# Patient Record
Sex: MALE | Race: WHITE
[De-identification: names, ages, dates, MRNs, and addresses within clinical notes are randomized per-mention and may not be internally consistent; named-entity substitution may affect disease eponyms.]

---

## 2018-03-14 ENCOUNTER — HOSPITAL ENCOUNTER (INPATIENT)
Dept: HOSPITAL 24 - MED/SURG | Age: 48
LOS: 2 days | Discharge: HOME | DRG: 639 | End: 2018-03-16
Attending: INTERNAL MEDICINE | Admitting: INTERNAL MEDICINE
Payer: COMMERCIAL

## 2018-03-14 VITALS — BODY MASS INDEX: 29.7 KG/M2

## 2018-03-14 DIAGNOSIS — E11.621: Primary | ICD-10-CM

## 2018-03-14 DIAGNOSIS — L97.519: ICD-10-CM

## 2018-03-14 DIAGNOSIS — L03.031: ICD-10-CM

## 2018-03-14 DIAGNOSIS — L03.032: ICD-10-CM

## 2018-03-14 DIAGNOSIS — E11.65: ICD-10-CM

## 2018-03-14 DIAGNOSIS — I25.10: ICD-10-CM

## 2018-03-14 DIAGNOSIS — I10: ICD-10-CM

## 2018-03-14 DIAGNOSIS — E11.40: ICD-10-CM

## 2018-03-14 DIAGNOSIS — L97.529: ICD-10-CM

## 2018-03-14 DIAGNOSIS — B96.89: ICD-10-CM

## 2018-03-14 LAB
ALBUMIN SERPL BCP-MCNC: 3.7 G/DL (ref 3.4–5)
ALP SERPL-CCNC: 90 UNITS/L (ref 46–116)
ALT SERPL W P-5'-P-CCNC: 24 UNITS/L (ref 12–78)
AST SERPL W P-5'-P-CCNC: 11 UNITS/L (ref 15–37)
BASOPHILS # BLD AUTO: 0.1 X10^3/UL (ref 0–0.1)
BASOPHILS NFR BLD AUTO: 0.7 % (ref 0.2–1)
BUN SERPL-MCNC: 11 MG/DL (ref 7–18)
CALCIUM ALBUM COR SERPL-SCNC: (no result) MG/DL (ref 8.5–10.1)
CALCIUM ALBUM COR SERPL-SCNC: 139 MMOL/L (ref 136–145)
CALCIUM SERPL-MCNC: 8.5 MG/DL (ref 8.5–10.1)
CHLORIDE SERPL-SCNC: 98 MMOL/L (ref 98–107)
CO2 SERPL-SCNC: 24.6 MMOL/L (ref 21–32)
CREAT SERPL-MCNC: 0.92 MG/DL (ref 0.7–1.3)
EGFR  BLACK RACES: > 60 (ref 60–?)
EOSINOPHIL # BLD AUTO: 0.8 X10^3/UL (ref 0–0.2)
EOSINOPHIL NFR BLD AUTO: 5.5 % (ref 0.9–2.9)
ERYTHROCYTE [DISTWIDTH] IN BLOOD BY AUTOMATED COUNT: 13.3 % (ref 11.6–16.5)
HCT VFR BLD AUTO: 42.8 % (ref 42–54)
HGB BLD-MCNC: 15 G/DL (ref 13.5–18)
LACTATE SERPL-SCNC: 2.6 MMOL/L (ref 0.4–2)
LYMPHOCYTES # BLD AUTO: 3.7 X10^3/UL (ref 1.3–2.9)
LYMPHOCYTES NFR BLD AUTO: 25.7 % (ref 21–51)
MCH RBC QN AUTO: 30.5 PG (ref 27–34)
MCHC RBC AUTO-ENTMCNC: 35 G/DL (ref 33–35)
MCV RBC AUTO: 87.1 FL (ref 80–100)
MONOCYTES # BLD AUTO: 1.1 X10^3/UL (ref 0.3–0.8)
MONOCYTES NFR BLD AUTO: 7.3 % (ref 0–13)
NEUTROPHILS # BLD AUTO: 8.8 X10^3/UL (ref 2.2–4.8)
NEUTROPHILS NFR BLD AUTO: 60.8 % (ref 42–75)
PLATELET # BLD: 194 X10^3/UL (ref 150–450)
PMV BLD AUTO: 9.7 FL (ref 7.4–11)
PROT SERPL-MCNC: 7.7 G/DL (ref 6.4–8.2)
RBC # BLD AUTO: 4.91 X10^6/UL (ref 4.7–6)
SODIUM SERPL-SCNC: 133 MMOL/L (ref 136–145)
WBC NRBC COR # BLD AUTO: 14.5 X10^3/UL (ref 3.6–10)

## 2018-03-14 PROCEDURE — 87205 SMEAR GRAM STAIN: CPT

## 2018-03-14 PROCEDURE — 71046 X-RAY EXAM CHEST 2 VIEWS: CPT

## 2018-03-14 PROCEDURE — 87086 URINE CULTURE/COLONY COUNT: CPT

## 2018-03-14 PROCEDURE — 87077 CULTURE AEROBIC IDENTIFY: CPT

## 2018-03-14 PROCEDURE — 93010 ELECTROCARDIOGRAM REPORT: CPT

## 2018-03-14 PROCEDURE — 85025 COMPLETE CBC W/AUTO DIFF WBC: CPT

## 2018-03-14 PROCEDURE — 81001 URINALYSIS AUTO W/SCOPE: CPT

## 2018-03-14 PROCEDURE — 83605 ASSAY OF LACTIC ACID: CPT

## 2018-03-14 PROCEDURE — 82565 ASSAY OF CREATININE: CPT

## 2018-03-14 PROCEDURE — 36415 COLL VENOUS BLD VENIPUNCTURE: CPT

## 2018-03-14 PROCEDURE — 87186 SC STD MICRODIL/AGAR DIL: CPT

## 2018-03-14 PROCEDURE — 80202 ASSAY OF VANCOMYCIN: CPT

## 2018-03-14 PROCEDURE — 87070 CULTURE OTHR SPECIMN AEROBIC: CPT

## 2018-03-14 PROCEDURE — 94640 AIRWAY INHALATION TREATMENT: CPT

## 2018-03-14 PROCEDURE — 80053 COMPREHEN METABOLIC PANEL: CPT

## 2018-03-14 PROCEDURE — 93005 ELECTROCARDIOGRAM TRACING: CPT

## 2018-03-14 PROCEDURE — 87040 BLOOD CULTURE FOR BACTERIA: CPT

## 2018-03-14 RX ADMIN — VANCOMYCIN HYDROCHLORIDE SCH MLS/HR: 500 INJECTION, POWDER, LYOPHILIZED, FOR SOLUTION INTRAVENOUS at 22:30

## 2018-03-14 RX ADMIN — LEVOFLOXACIN SCH MLS/HR: 5 INJECTION, SOLUTION INTRAVENOUS at 20:51

## 2018-03-14 RX ADMIN — MAGNESIUM HYDROXIDE SCH: 400 SUSPENSION ORAL at 21:37

## 2018-03-14 RX ADMIN — NICOTINE SCH EA: 21 PATCH, EXTENDED RELEASE TRANSDERMAL at 23:30

## 2018-03-14 RX ADMIN — HYDROCODONE BITARTRATE AND ACETAMINOPHEN PRN TAB: 7.5; 325 TABLET ORAL at 20:39

## 2018-03-14 RX ADMIN — DOCUSATE SODIUM SCH MG: 100 CAPSULE, LIQUID FILLED ORAL at 20:50

## 2018-03-14 RX ADMIN — DOCUSATE SODIUM SCH: 100 CAPSULE, LIQUID FILLED ORAL at 21:37

## 2018-03-14 RX ADMIN — MAGNESIUM HYDROXIDE SCH ML: 400 SUSPENSION ORAL at 20:50

## 2018-03-14 RX ADMIN — SODIUM CHLORIDE SCH MLS/HR: 9 INJECTION, SOLUTION INTRAVENOUS at 20:51

## 2018-03-15 LAB
BACTERIA URNS QL MICRO: NEGATIVE /HPF
BILIRUB UR QL STRIP.AUTO: NEGATIVE
CLARITY UR: CLEAR
COLOR UR AUTO: YELLOW
CREAT SERPL-MCNC: 0.89 MG/DL (ref 0.7–1.3)
GLUCOSE UR QL STRIP.AUTO: (no result)
KETONES UR QL STRIP.AUTO: NEGATIVE
LEUKOCYTE ESTERASE UR QL STRIP.AUTO: NEGATIVE
NITRITE UR QL STRIP.AUTO: NEGATIVE
PH UR STRIP.AUTO: 6.5 [PH] (ref 5–8)
PROT UR QL STRIP.AUTO: NEGATIVE
RBC # UR STRIP.AUTO: NEGATIVE /UL
RBC #/AREA URNS HPF: (no result) /HPF
SP GR UR STRIP.AUTO: 1.01 (ref 1–1.03)
SQUAMOUS URNS QL MICRO: (no result) /HPF
UROBILINOGEN UR QL STRIP.AUTO: NORMAL
VANCOMYCIN TROUGH SERPL-MCNC: 10.3 UG/ML (ref 15–20)

## 2018-03-15 RX ADMIN — LEVOFLOXACIN SCH MLS/HR: 5 INJECTION, SOLUTION INTRAVENOUS at 14:44

## 2018-03-15 RX ADMIN — VANCOMYCIN HYDROCHLORIDE SCH MLS/HR: 1 INJECTION, POWDER, LYOPHILIZED, FOR SOLUTION INTRAVENOUS at 14:10

## 2018-03-15 RX ADMIN — VANCOMYCIN HYDROCHLORIDE SCH MLS/HR: 500 INJECTION, POWDER, LYOPHILIZED, FOR SOLUTION INTRAVENOUS at 05:39

## 2018-03-15 RX ADMIN — NICOTINE SCH EA: 21 PATCH, EXTENDED RELEASE TRANSDERMAL at 10:05

## 2018-03-15 RX ADMIN — DOCUSATE SODIUM SCH: 100 CAPSULE, LIQUID FILLED ORAL at 20:19

## 2018-03-15 RX ADMIN — HYDROCODONE BITARTRATE AND ACETAMINOPHEN PRN TAB: 7.5; 325 TABLET ORAL at 16:00

## 2018-03-15 RX ADMIN — BUDESONIDE SCH EA: 0.5 INHALANT RESPIRATORY (INHALATION) at 21:20

## 2018-03-15 RX ADMIN — SILVER SULFADIAZINE SCH APPLIC: 10 CREAM TOPICAL at 11:24

## 2018-03-15 RX ADMIN — HYDROCODONE BITARTRATE AND ACETAMINOPHEN PRN TAB: 7.5; 325 TABLET ORAL at 09:51

## 2018-03-15 RX ADMIN — SODIUM CHLORIDE SCH MLS/HR: 9 INJECTION, SOLUTION INTRAVENOUS at 21:21

## 2018-03-15 RX ADMIN — HYDROCODONE BITARTRATE AND ACETAMINOPHEN PRN TAB: 7.5; 325 TABLET ORAL at 03:45

## 2018-03-15 RX ADMIN — VANCOMYCIN HYDROCHLORIDE SCH MLS/HR: 1 INJECTION, POWDER, LYOPHILIZED, FOR SOLUTION INTRAVENOUS at 22:08

## 2018-03-15 RX ADMIN — BUDESONIDE SCH EA: 0.5 INHALANT RESPIRATORY (INHALATION) at 15:26

## 2018-03-15 RX ADMIN — SILVER SULFADIAZINE SCH APPLIC: 10 CREAM TOPICAL at 20:22

## 2018-03-15 RX ADMIN — MAGNESIUM HYDROXIDE SCH: 400 SUSPENSION ORAL at 20:19

## 2018-03-15 RX ADMIN — HYDROCODONE BITARTRATE AND ACETAMINOPHEN PRN TAB: 7.5; 325 TABLET ORAL at 21:31

## 2018-03-15 NOTE — PCM.PROG
Progress Note





- Progress Note for Day of


Date: 03/15/18





- Subjective


Subjective: c/oburning feeling  both feet , no active drainage now





- Past Medical Family Social History


Past Med/Fam/Surg Hx: No changes since H&P


Allergies: 


Allergies





acetaminophen [From Percocet] Adverse Reaction (Verified 03/14/18 20:22)


 


oxycodone [From Percocet] Adverse Reaction (Verified 03/14/18 20:22)


 


Sulfa (Sulfonamide Antibiotics) [SULFA] Adverse Reaction (Verified 03/14/18 20:

22)


 











- Review of Systems


ROS: No change since H&P, Changes notes (describe) (long standing H/O DM with 

diabetic neuropathy , CAD , HTN)





- Vital Signs and I&O's


Vital Signs: 


 





Temperature                      97.4 F


Pulse Rate [Left Radial]         85


Pulse Rate                       73


Respiratory Rate                 20


Blood Pressure [Right Arm]       154/81


Blood Pressure [Left Arm]        143/78


Blood Pressure                   143/78


O2 Sat by Pulse Oximetry         99








Intake and Output: 


 Intake & Output











 03/13/18 03/14/18 03/15/18 03/16/18





 11:59 11:59 11:59 11:59


 


Intake Total   1420 720


 


Output Total   1925 800


 


Balance   -505 -80














- Physical Exam


Oriented: Normal


Eyes: Normal


Ear: Normal


Nose: Normal


Throat: Normal


Respiratory: Normal


Cardiovascular: Normal


: Normal


Auscultation: Bowel Sounds: Normal


Tenderness: Normal


Skin: Wound (BOTTOM OF 1ST, 2ND AND 3RD DIGITS TO BOTH FEET)


Musculoskeletal: Back:Lumbar


Psychiatric: Anxiety


Affect: Anxious


Speech Pattern: Clear, Appropriate





- Laboratory and Diagnostics


Result Diagrams: 


 03/14/18 20:06





 03/14/18 20:03


Labs: 


 





03/14/18 20:43   Toe - Right Big   Gram Stain - Final





 Laboratory











WBC  14.5 X10^3/uL (3.6-10.0)  H  03/14/18  20:06    


 


RBC  4.91 X10^6/uL (4.7-6.0)   03/14/18  20:06    


 


Hgb  15.0 g/dL (13.5-18.0)   03/14/18  20:06    


 


Hct  42.8 % (42.0-54.0)   03/14/18  20:06    


 


MCV  87.1 fL (80.0-100.0)   03/14/18  20:06    


 


MCH  30.5 pg (27.0-34.0)   03/14/18  20:06    


 


MCHC  35.0 g/dL (33.0-35.0)   03/14/18  20:06    


 


RDW  13.3 % (11.6-16.5)   03/14/18  20:06    


 


Plt Count  194 X10^3/uL (150.0-450.0)   03/14/18  20:06    


 


MPV  9.7 fL (7.4-11.0)   03/14/18  20:06    


 


Neut % (Auto)  60.8 % (42.0-75.0)   03/14/18  20:06    


 


Lymph % (Auto)  25.7 % (21.0-51.0)   03/14/18  20:06    


 


Mono % (Auto)  7.3 % (0.0-13.0)   03/14/18  20:06    


 


Eos % (Auto)  5.5 % (0.9-2.9)  H  03/14/18  20:06    


 


Baso % (Auto)  0.7 % (0.2-1.0)   03/14/18  20:06    


 


Neut # (Auto)  8.8 x10^3/uL (2.2-4.8)  H  03/14/18  20:06    


 


Lymph # (Auto)  3.7 X10^3/uL (1.3-2.9)  H  03/14/18  20:06    


 


Mono # (Auto)  1.1 x10^3/uL (0.3-0.8)  H  03/14/18  20:06    


 


Eos # (Auto)  0.8 x10^3/uL (0.0-0.2)  H  03/14/18  20:06    


 


Baso # (Auto)  0.1 X10^3/uL (0.0-0.1)   03/14/18  20:06    


 


Absolute Nucleated RBC  0.0 /100WBC  03/14/18  20:06    


 


Sodium  133 mmol/L (136-145)  L  03/14/18  20:03    


 


Corrected Sodium  139 mmol/L (136-145)   03/14/18  20:03    


 


Potassium  4.5 mmol/L (3.5-5.1)   03/14/18  20:03    


 


Chloride  98 mmol/L ()   03/14/18  20:03    


 


Carbon Dioxide  24.6 mmol/L (21-32)   03/14/18  20:03    


 


BUN  11 mg/dL (7-18)   03/14/18  20:03    


 


Creatinine  0.92 mg/dL (0.70-1.30)   03/14/18  20:03    


 


Est GFR (MDRD) Af Amer  > 60  (>60)   03/14/18  20:03    


 


Est GFR (MDRD) Non-Af  > 60  (>60)   03/14/18  20:03    


 


Glucose  365 mg/dL (65-99)  H  03/14/18  20:03    


 


POC Glucose (mg/dL)  322 mg/dL (65-99)  H  03/15/18  16:48    


 


Lactic Acid  2.6 mmol/L (0.4-2.0)  H  03/14/18  20:03    


 


Calcium  8.5 mg/dL (8.5-10.1)   03/14/18  20:03    


 


Corrected Calcium  TNP   03/14/18  20:03    


 


Total Bilirubin  0.40 mg/dL (0.2-1.0)   03/14/18  20:03    


 


AST  11 Units/L (15-37)  L  03/14/18  20:03    


 


ALT  24 Units/L (12-78)   03/14/18  20:03    


 


Alkaline Phosphatase  90 Units/L ()   03/14/18  20:03    


 


Total Protein  7.7 g/dL (6.4-8.2)   03/14/18  20:03    


 


Albumin  3.7 g/dL (3.4-5.0)   03/14/18  20:03    


 


Globulin  4.0 g/dL (2.5-4.5)   03/14/18  20:03    


 


Albumin/Globulin Ratio  0.9 Ratio (1.1-2.1)  L  03/14/18  20:03    


 


Specimen Type  Clean catch urine   03/14/18  23:18    


 


Urine Color  Yellow  (YELLOW)   03/14/18  23:18    


 


Urine Appearance  Clear  (CLEAR)   03/14/18  23:18    


 


Urine pH  6.5  (5.0 - 8.0)   03/14/18  23:18    


 


Ur Specific Gravity  1.010  (1.000-1.030)   03/14/18  23:18    


 


Urine Protein  Negative  (NEGATIVE)   03/14/18  23:18    


 


Urine Glucose (UA)  4+  (NEGATIVE)   03/14/18  23:18    


 


Urine Ketones  Negative  (NEGATIVE)   03/14/18  23:18    


 


Urine Occult Blood  Negative  (NEGATIVE)   03/14/18  23:18    


 


Urine Nitrite  Negative  (NEGATIVE)   03/14/18  23:18    


 


Urine Bilirubin  Negative  (NEGATIVE)   03/14/18  23:18    


 


Urine Urobilinogen  Normal  (NORMAL)   03/14/18  23:18    


 


Ur Leukocyte Esterase  Negative  (NEGATIVE)   03/14/18  23:18    


 


Urine RBC  0-3 /HPF (NONE SEEN)   03/14/18  23:18    


 


Urine WBC  0-3 /HPF (NONE SEEN)   03/14/18  23:18    


 


Ur Squamous Epith Cells  Rare /HPF (NEGATIVE)   03/14/18  23:18    


 


Urine Bacteria  Negative /HPF (NEGATIVE)   03/14/18  23:18    


 


Ur Culture Indicated?  Yes/culture set up   03/14/18  23:18    














- Plan


(1) Diabetic ulcer of toe associated with diabetes mellitus due to underlying 

condition


Status: Acute   


Plan: IV ATBX, WOUND CARE.  OBTAIN RAD REPORT FROM [facility] IN [place].  SURGICAL 

CONSULT PER DR BARBOSA.  PAIN CONTROL, BLOOD SUGAR CONTROL, AM LABS   





(2) Diabetic ulcer of toes of both feet


Status: Acute   


Plan: IV ATB.  leg elevation .  local care with silvadene cream BID.  DVT 

prophylaxis   





(3) CAD (coronary artery disease)


Status: Chronic   





(4) Diabetes mellitus, type 2


Status: Chronic   





(5) Hypertension


Status: Chronic

## 2018-03-15 NOTE — PCM.PROG
Progress Note





- Progress Note for Day of


Date: 03/15/18





- Subjective


Subjective: c/oburning feeling  both feet , no active drainage now





- Past Medical Family Social History


Allergies: 


Allergies





acetaminophen [From Percocet] Adverse Reaction (Verified 03/14/18 20:22)


 


oxycodone [From Percocet] Adverse Reaction (Verified 03/14/18 20:22)


 


Sulfa (Sulfonamide Antibiotics) [SULFA] Adverse Reaction (Verified 03/14/18 20:

22)


 











- Review of Systems


ROS: Changes notes (describe) (long standing H/O DM with diabetic neuropathy , 

CAD , HTN)





- Vital Signs and I&O's


Vital Signs: 


 





Temperature                      98.2 F


Pulse Rate [Left Radial]         69


Respiratory Rate                 18


Blood Pressure [Right Arm]       135/68


Blood Pressure [Left Arm]        143/78


Blood Pressure                   143/78


O2 Sat by Pulse Oximetry         98








Intake and Output: 


 Intake & Output











 03/12/18 03/13/18 03/14/18 03/15/18





 11:59 11:59 11:59 11:59


 


Intake Total    1420


 


Output Total    1925


 


Balance    -505














- Physical Exam


Oriented: Normal


Eyes: Normal


Nose: Normal


Throat: Normal


Respiratory: Normal


Cardiovascular: Normal


: Normal


Auscultation: Bowel Sounds: Normal


Palpation: Normal


Tenderness: Normal


Skin: Wound (has burn like injuries to the solar aspect of the 1,2, toes on Rt 

and 1,2 toes on the Lt with associated  cellulitis of the toes .no significant 

necrosis .distal pulses are present )


Musculoskeletal: Foot (see above ulceration and cellulitis 1,2,3 toes on the Rt 

and 1,2 toes on the Lt )


Speech Pattern: Clear, Appropriate





- Laboratory and Diagnostics


Result Diagrams: 


 03/14/18 20:06





 03/14/18 20:03


Labs: 


 





03/14/18 20:43   Toe - Right Big   Gram Stain - Final





 Laboratory











WBC  14.5 X10^3/uL (3.6-10.0)  H  03/14/18  20:06    


 


RBC  4.91 X10^6/uL (4.7-6.0)   03/14/18  20:06    


 


Hgb  15.0 g/dL (13.5-18.0)   03/14/18  20:06    


 


Hct  42.8 % (42.0-54.0)   03/14/18  20:06    


 


MCV  87.1 fL (80.0-100.0)   03/14/18  20:06    


 


MCH  30.5 pg (27.0-34.0)   03/14/18  20:06    


 


MCHC  35.0 g/dL (33.0-35.0)   03/14/18  20:06    


 


RDW  13.3 % (11.6-16.5)   03/14/18  20:06    


 


Plt Count  194 X10^3/uL (150.0-450.0)   03/14/18  20:06    


 


MPV  9.7 fL (7.4-11.0)   03/14/18  20:06    


 


Neut % (Auto)  60.8 % (42.0-75.0)   03/14/18  20:06    


 


Lymph % (Auto)  25.7 % (21.0-51.0)   03/14/18  20:06    


 


Mono % (Auto)  7.3 % (0.0-13.0)   03/14/18  20:06    


 


Eos % (Auto)  5.5 % (0.9-2.9)  H  03/14/18  20:06    


 


Baso % (Auto)  0.7 % (0.2-1.0)   03/14/18  20:06    


 


Neut # (Auto)  8.8 x10^3/uL (2.2-4.8)  H  03/14/18  20:06    


 


Lymph # (Auto)  3.7 X10^3/uL (1.3-2.9)  H  03/14/18  20:06    


 


Mono # (Auto)  1.1 x10^3/uL (0.3-0.8)  H  03/14/18  20:06    


 


Eos # (Auto)  0.8 x10^3/uL (0.0-0.2)  H  03/14/18  20:06    


 


Baso # (Auto)  0.1 X10^3/uL (0.0-0.1)   03/14/18  20:06    


 


Absolute Nucleated RBC  0.0 /100WBC  03/14/18  20:06    


 


Sodium  133 mmol/L (136-145)  L  03/14/18  20:03    


 


Corrected Sodium  139 mmol/L (136-145)   03/14/18  20:03    


 


Potassium  4.5 mmol/L (3.5-5.1)   03/14/18  20:03    


 


Chloride  98 mmol/L ()   03/14/18  20:03    


 


Carbon Dioxide  24.6 mmol/L (21-32)   03/14/18  20:03    


 


BUN  11 mg/dL (7-18)   03/14/18  20:03    


 


Creatinine  0.92 mg/dL (0.70-1.30)   03/14/18  20:03    


 


Est GFR (MDRD) Af Amer  > 60  (>60)   03/14/18  20:03    


 


Est GFR (MDRD) Non-Af  > 60  (>60)   03/14/18  20:03    


 


Glucose  365 mg/dL (65-99)  H  03/14/18  20:03    


 


POC Glucose (mg/dL)  251 mg/dL (65-99)  H  03/15/18  05:16    


 


Lactic Acid  2.6 mmol/L (0.4-2.0)  H  03/14/18  20:03    


 


Calcium  8.5 mg/dL (8.5-10.1)   03/14/18  20:03    


 


Corrected Calcium  TNP   03/14/18  20:03    


 


Total Bilirubin  0.40 mg/dL (0.2-1.0)   03/14/18  20:03    


 


AST  11 Units/L (15-37)  L  03/14/18  20:03    


 


ALT  24 Units/L (12-78)   03/14/18  20:03    


 


Alkaline Phosphatase  90 Units/L ()   03/14/18  20:03    


 


Total Protein  7.7 g/dL (6.4-8.2)   03/14/18  20:03    


 


Albumin  3.7 g/dL (3.4-5.0)   03/14/18  20:03    


 


Globulin  4.0 g/dL (2.5-4.5)   03/14/18  20:03    


 


Albumin/Globulin Ratio  0.9 Ratio (1.1-2.1)  L  03/14/18  20:03    


 


Specimen Type  Clean catch urine   03/14/18  23:18    


 


Urine Color  Yellow  (YELLOW)   03/14/18  23:18    


 


Urine Appearance  Clear  (CLEAR)   03/14/18  23:18    


 


Urine pH  6.5  (5.0 - 8.0)   03/14/18  23:18    


 


Ur Specific Gravity  1.010  (1.000-1.030)   03/14/18  23:18    


 


Urine Protein  Negative  (NEGATIVE)   03/14/18  23:18    


 


Urine Glucose (UA)  4+  (NEGATIVE)   03/14/18  23:18    


 


Urine Ketones  Negative  (NEGATIVE)   03/14/18  23:18    


 


Urine Occult Blood  Negative  (NEGATIVE)   03/14/18  23:18    


 


Urine Nitrite  Negative  (NEGATIVE)   03/14/18  23:18    


 


Urine Bilirubin  Negative  (NEGATIVE)   03/14/18  23:18    


 


Urine Urobilinogen  Normal  (NORMAL)   03/14/18  23:18    


 


Ur Leukocyte Esterase  Negative  (NEGATIVE)   03/14/18  23:18    


 


Urine RBC  0-3 /HPF (NONE SEEN)   03/14/18  23:18    


 


Urine WBC  0-3 /HPF (NONE SEEN)   03/14/18  23:18    


 


Ur Squamous Epith Cells  Rare /HPF (NEGATIVE)   03/14/18  23:18    


 


Urine Bacteria  Negative /HPF (NEGATIVE)   03/14/18  23:18    


 


Ur Culture Indicated?  Yes/culture set up   03/14/18  23:18    














- Plan


(1) Diabetic ulcer of toes of both feet


Status: Acute   


Plan: IV ATB.  leg elevation .  local care with silvadene cream BID.  DVT 

prophylaxis   





(2) Diabetic neuropathy associated with secondary diabetes mellitus


Status: Acute

## 2018-03-15 NOTE — RAD
HISTORY:  Lower extremity wounds, diabetes, hypertension, asthma, coronary artery disease, lung carci
noma remission



Study: PA and lateral chest



Comparison: October 24, 2016



Findings:



The trachea is midline.  The cardiac silhouette is unremarkable.  The lungs are clear without focal m
ass or consolidation.  There is no effusion or pneumothorax.  The bony thorax is grossly unremarkable
.  



IMPRESSION: 

 

No acute cardiopulmonary disease.





Reported By:Electronically Signed by TIMMY STEIN MD at 3/15/2018 11:11:31 AM

## 2018-03-16 VITALS — DIASTOLIC BLOOD PRESSURE: 76 MMHG | SYSTOLIC BLOOD PRESSURE: 147 MMHG

## 2018-03-16 LAB
ALBUMIN SERPL BCP-MCNC: 3.2 G/DL (ref 3.4–5)
ALP SERPL-CCNC: 81 UNITS/L (ref 46–116)
ALT SERPL W P-5'-P-CCNC: 23 UNITS/L (ref 12–78)
AST SERPL W P-5'-P-CCNC: 6 UNITS/L (ref 15–37)
BASOPHILS # BLD AUTO: 0 X10^3/UL (ref 0–0.1)
BASOPHILS NFR BLD AUTO: 0.4 % (ref 0.2–1)
BUN SERPL-MCNC: 10 MG/DL (ref 7–18)
CALCIUM ALBUM COR SERPL-SCNC: 139 MMOL/L (ref 136–145)
CALCIUM ALBUM COR SERPL-SCNC: 9.2 MG/DL (ref 8.5–10.1)
CALCIUM SERPL-MCNC: 8.6 MG/DL (ref 8.5–10.1)
CHLORIDE SERPL-SCNC: 99 MMOL/L (ref 98–107)
CO2 SERPL-SCNC: 25.6 MMOL/L (ref 21–32)
CREAT SERPL-MCNC: 0.86 MG/DL (ref 0.7–1.3)
EGFR  BLACK RACES: > 60 (ref 60–?)
EOSINOPHIL # BLD AUTO: 0.5 X10^3/UL (ref 0–0.2)
EOSINOPHIL NFR BLD AUTO: 4.3 % (ref 0.9–2.9)
ERYTHROCYTE [DISTWIDTH] IN BLOOD BY AUTOMATED COUNT: 13.2 % (ref 11.6–16.5)
HCT VFR BLD AUTO: 41.4 % (ref 42–54)
HGB BLD-MCNC: 14.5 G/DL (ref 13.5–18)
LYMPHOCYTES # BLD AUTO: 3.2 X10^3/UL (ref 1.3–2.9)
LYMPHOCYTES NFR BLD AUTO: 26.8 % (ref 21–51)
MCH RBC QN AUTO: 30.4 PG (ref 27–34)
MCHC RBC AUTO-ENTMCNC: 35.1 G/DL (ref 33–35)
MCV RBC AUTO: 86.8 FL (ref 80–100)
MONOCYTES # BLD AUTO: 1 X10^3/UL (ref 0.3–0.8)
MONOCYTES NFR BLD AUTO: 7.9 % (ref 0–13)
NEUTROPHILS # BLD AUTO: 7.3 X10^3/UL (ref 2.2–4.8)
NEUTROPHILS NFR BLD AUTO: 60.6 % (ref 42–75)
PLATELET # BLD: 223 X10^3/UL (ref 150–450)
PMV BLD AUTO: 9.9 FL (ref 7.4–11)
PROT SERPL-MCNC: 7.4 G/DL (ref 6.4–8.2)
RBC # BLD AUTO: 4.77 X10^6/UL (ref 4.7–6)
SODIUM SERPL-SCNC: 134 MMOL/L (ref 136–145)
WBC NRBC COR # BLD AUTO: 12 X10^3/UL (ref 3.6–10)

## 2018-03-16 RX ADMIN — NICOTINE SCH: 21 PATCH, EXTENDED RELEASE TRANSDERMAL at 09:47

## 2018-03-16 RX ADMIN — BUDESONIDE SCH EA: 0.5 INHALANT RESPIRATORY (INHALATION) at 10:02

## 2018-03-16 RX ADMIN — HYDROCODONE BITARTRATE AND ACETAMINOPHEN PRN TAB: 7.5; 325 TABLET ORAL at 04:37

## 2018-03-16 RX ADMIN — LEVOFLOXACIN SCH MLS/HR: 5 INJECTION, SOLUTION INTRAVENOUS at 08:30

## 2018-03-16 RX ADMIN — SILVER SULFADIAZINE SCH APPLIC: 10 CREAM TOPICAL at 09:47

## 2018-03-16 RX ADMIN — VANCOMYCIN HYDROCHLORIDE SCH MLS/HR: 1 INJECTION, POWDER, LYOPHILIZED, FOR SOLUTION INTRAVENOUS at 05:08

## 2019-10-31 ENCOUNTER — HOSPITAL ENCOUNTER (OUTPATIENT)
Dept: HOSPITAL 67 - ED | Age: 49
Setting detail: OBSERVATION
LOS: 1 days | Discharge: HOME | End: 2019-11-01
Attending: INTERNAL MEDICINE | Admitting: EMERGENCY MEDICINE
Payer: COMMERCIAL

## 2019-10-31 VITALS
BODY MASS INDEX: 26.71 KG/M2 | SYSTOLIC BLOOD PRESSURE: 183 MMHG | WEIGHT: 201.56 LBS | HEIGHT: 73 IN | BODY MASS INDEX: 26.71 KG/M2 | HEIGHT: 73 IN | WEIGHT: 201.56 LBS | DIASTOLIC BLOOD PRESSURE: 90 MMHG | TEMPERATURE: 98.36 F

## 2019-10-31 DIAGNOSIS — Z72.0: ICD-10-CM

## 2019-10-31 DIAGNOSIS — I25.10: ICD-10-CM

## 2019-10-31 DIAGNOSIS — G89.4: ICD-10-CM

## 2019-10-31 DIAGNOSIS — E83.42: ICD-10-CM

## 2019-10-31 DIAGNOSIS — I10: ICD-10-CM

## 2019-10-31 DIAGNOSIS — R07.89: Primary | ICD-10-CM

## 2019-10-31 DIAGNOSIS — E11.9: ICD-10-CM

## 2019-10-31 DIAGNOSIS — K21.9: ICD-10-CM

## 2019-10-31 DIAGNOSIS — E87.1: ICD-10-CM

## 2019-10-31 LAB
PLATELET # BLD: 389 K/UL (ref 142–355)
POTASSIUM SERPL-SCNC: 4.2 MMOL/L (ref 3.6–5.2)
SODIUM SERPL-SCNC: 128 MMOL/L (ref 136–145)

## 2019-10-31 PROCEDURE — 84484 ASSAY OF TROPONIN QUANT: CPT

## 2019-10-31 PROCEDURE — 99284 EMERGENCY DEPT VISIT MOD MDM: CPT

## 2019-10-31 PROCEDURE — 36415 COLL VENOUS BLD VENIPUNCTURE: CPT

## 2019-10-31 PROCEDURE — 93005 ELECTROCARDIOGRAM TRACING: CPT

## 2019-10-31 PROCEDURE — 85730 THROMBOPLASTIN TIME PARTIAL: CPT

## 2019-10-31 PROCEDURE — 80048 BASIC METABOLIC PNL TOTAL CA: CPT

## 2019-10-31 PROCEDURE — 99220: CPT

## 2019-10-31 PROCEDURE — 80053 COMPREHEN METABOLIC PANEL: CPT

## 2019-10-31 PROCEDURE — 85027 COMPLETE CBC AUTOMATED: CPT

## 2019-10-31 PROCEDURE — 85610 PROTHROMBIN TIME: CPT

## 2019-10-31 PROCEDURE — 96375 TX/PRO/DX INJ NEW DRUG ADDON: CPT

## 2019-10-31 PROCEDURE — 82550 ASSAY OF CK (CPK): CPT

## 2019-10-31 PROCEDURE — G0378 HOSPITAL OBSERVATION PER HR: HCPCS

## 2019-10-31 PROCEDURE — 96360 HYDRATION IV INFUSION INIT: CPT

## 2019-10-31 PROCEDURE — 83690 ASSAY OF LIPASE: CPT

## 2019-10-31 PROCEDURE — 83735 ASSAY OF MAGNESIUM: CPT

## 2019-11-01 VITALS — TEMPERATURE: 97.6 F | SYSTOLIC BLOOD PRESSURE: 151 MMHG | DIASTOLIC BLOOD PRESSURE: 81 MMHG

## 2019-11-01 VITALS — TEMPERATURE: 97.7 F | DIASTOLIC BLOOD PRESSURE: 63 MMHG | SYSTOLIC BLOOD PRESSURE: 130 MMHG

## 2019-11-01 VITALS — DIASTOLIC BLOOD PRESSURE: 72 MMHG | SYSTOLIC BLOOD PRESSURE: 123 MMHG | TEMPERATURE: 98.3 F

## 2019-11-01 VITALS — SYSTOLIC BLOOD PRESSURE: 123 MMHG | TEMPERATURE: 98.3 F | DIASTOLIC BLOOD PRESSURE: 72 MMHG

## 2019-11-01 LAB
APTT BLD: 24.5 SECONDS (ref 24.5–33.6)
POTASSIUM SERPL-SCNC: 3.8 MMOL/L (ref 3.6–5.2)
SODIUM SERPL-SCNC: 133 MMOL/L (ref 136–145)

## 2019-11-01 NOTE — NUR
11/1/19 0135: BLOOD SUGAR 431, ER  NOTIFIED. ORDER RECEIVED FOR 8 UNITS OF
REG INSULIN, HOME DOSE OF LANTUS, AND 2MG OF MAGNESIUM SULFATE IVPB. ORDER
WRITTEN AND SENT TO PHARM D.

## 2019-11-01 NOTE — NUR
Patient was admitted with [facility] and had cardiac bypass surgery 4 weeks
ago and has been back to [facility] 2 times and they tell him it is left shoulder
paiin muscoskeletal and not cardiac.  Plavix, Lisinopril, INsulin R, insulin
H, has chronic back pain and is on hydrocodone and smokes less than 1 pack a
day.  On 1800 calorie diet and labs reveal K 33 depressed, glucose 304 and 462
elevated, na 28 depressed cl 9.3 depressed, Mg 1.6 depressed and lipase
depressed at 38.  Dx. of CP, Hypoatremia, and DM and is 6'1" at 201.9 lbs and
is a 48 YOM.  IBW +/-10% and is 110% of IBW and BMI at 26.6 overweight
and kcal for IBW = x 30= 2520; Protein- grams x 1.0/1.3, and fluids for
weight x 30 = 2755 ml per day.
 
Recommend:
1- 2200 Calorie High Fiber Low Fat/Cholesterol

## 2019-11-01 NOTE — NUR
11/1/19 0315: BLOOD SUGAR RECHECKED PER DR'S REQUEST, BLOOD SUGAR 351. 10
UNITS REG INSULIN SQ PER  PROTOCOL.

## 2019-11-01 NOTE — NUR
10/31/19 2320: PT ARRIVED FROM ER BY WHEELCHAIR TO ROOM 1110. PT
ALERT,ORIENTATED. PT HAD TRIPLE BYPASS APPROXIMATELY 3 WEEKS AGO. INCISION TO
CHEST HEALED. TELEMETRY APPLIED.IV FLUID ALREADY RUNNING.

## 2019-11-01 NOTE — NUR
PATIENT GIVEN DISCHARGE INSTRUCTIONS WITH PAIN SCRIPT. PATIENT IV DISCONTINUED
WITH TIP INTACT. PATIENT EDUCATED TO FOLLOW UP WITH PCP. PATIENT VERBALIZED
UNDERSTANDING. PATIENT AMBULATED WITH FAMILY IN NO ACUTE DISTRESS.

## 2021-04-28 ENCOUNTER — HOSPITAL ENCOUNTER (OUTPATIENT)
Dept: HOSPITAL 67 - LAB | Age: 51
Discharge: HOME | End: 2021-04-28
Attending: NURSE PRACTITIONER
Payer: COMMERCIAL

## 2021-04-28 DIAGNOSIS — Z79.899: ICD-10-CM

## 2021-04-28 DIAGNOSIS — M19.90: ICD-10-CM

## 2021-04-28 DIAGNOSIS — R53.83: ICD-10-CM

## 2021-04-28 DIAGNOSIS — E78.5: ICD-10-CM

## 2021-04-28 DIAGNOSIS — E34.9: ICD-10-CM

## 2021-04-28 DIAGNOSIS — L40.9: ICD-10-CM

## 2021-04-28 DIAGNOSIS — E11.9: ICD-10-CM

## 2021-04-28 DIAGNOSIS — R53.81: ICD-10-CM

## 2021-04-28 DIAGNOSIS — I25.10: ICD-10-CM

## 2021-04-28 DIAGNOSIS — I10: ICD-10-CM

## 2021-04-28 DIAGNOSIS — Z00.00: Primary | ICD-10-CM

## 2021-04-28 LAB
LDLC SERPL-MCNC: 163 MG/DL (ref 0–?)
PLATELET # BLD: 295 K/UL (ref 142–355)
POTASSIUM SERPL-SCNC: 5.3 MMOL/L (ref 3.6–5.2)
SODIUM SERPL-SCNC: 131 MMOL/L (ref 136–145)

## 2021-04-28 PROCEDURE — 84443 ASSAY THYROID STIM HORMONE: CPT

## 2021-04-28 PROCEDURE — 86140 C-REACTIVE PROTEIN: CPT

## 2021-04-28 PROCEDURE — 82607 VITAMIN B-12: CPT

## 2021-04-28 PROCEDURE — 84550 ASSAY OF BLOOD/URIC ACID: CPT

## 2021-04-28 PROCEDURE — 84403 ASSAY OF TOTAL TESTOSTERONE: CPT

## 2021-04-28 PROCEDURE — 85027 COMPLETE CBC AUTOMATED: CPT

## 2021-04-28 PROCEDURE — 82306 VITAMIN D 25 HYDROXY: CPT

## 2021-04-28 PROCEDURE — 80061 LIPID PANEL: CPT

## 2021-04-28 PROCEDURE — 80053 COMPREHEN METABOLIC PANEL: CPT

## 2021-04-28 PROCEDURE — 84681 ASSAY OF C-PEPTIDE: CPT

## 2021-04-28 PROCEDURE — 83036 HEMOGLOBIN GLYCOSYLATED A1C: CPT

## 2021-04-28 PROCEDURE — 84439 ASSAY OF FREE THYROXINE: CPT

## 2025-02-27 NOTE — DR.H&P
H&P





- History & Physical for Day of:


H&P Date: 03/14/18





- Chief Complaint


Chief Complaint: redness and pain to toes on both feet with wounds





- Allergies


Allergies/Adverse Reactions: 


 Allergies











Allergy/AdvReac Type Severity Reaction Status Date / Time


 


acetaminophen [From Percocet] AdvReac   Verified 03/14/18 20:22


 


oxycodone [From Percocet] AdvReac   Verified 03/14/18 20:22


 


Sulfa (Sulfonamide AdvReac   Verified 03/14/18 20:22





Antibiotics)     





[SULFA]     














- History of Present Illness


History of Present Illness: 47 WM DIRECT ADMIT FROM DR MELTON OFFICE WITH 

CELLULITIS AND OPEN WOUNDS TO TOES ON BOTH FEET. PT HAS PMH OF DM, HTN AND HX 

MRSA. PT STATES HE WAS SEEN ER IN [place] THIS PAST WEEK AND HAD RADIOLOGIST 

STUDY CHECKING "CIRCULATION" IT WAS NORMAL PER PT. PT ADMITTED FOR EVALUATION 

AND TREAMENT OF CELLULITIS OF TOES, IV ATBX.  WOUND CARE, BP SUGAR CONTROL.





- Past Medical History


Past Medical History: Arthritis, Diabetes





- Past Surgical History


Surgical History: Angioplasty/Stents, Cholecystectomy, Ortho Surgery, Other





- Family History


Family Medical History: Diabetes Mellitus, MI





- Social History


Does patient currently use any type of tobacco product: Yes


Have you used tobacco products in the last 12 months: Yes


Type of Tobacco Use: Cigarettes


Alcohol Use: None


Drug Use: None





- Medications


Home Medications: 


 





Budesonide [Pulmicort Flexhaler] 1 puff IN BID 03/14/18 [History Confirmed 03/14 /18]


Insulin Aspart Protamine & Asp [Novolog Mix 70/30] 30 units SC BID 03/14/18 [

History Confirmed 03/14/18]


Insulin Lispro (Humalog) [Humalog] 10 units SC BID 03/14/18 [History Confirmed 

03/14/18]


Naproxen [NAPROSYN 500 MG *] 500 mg PO BID 03/14/18 [History Confirmed 03/14/18]


Pregabalin [Lyrica] 200 mg PO BID 03/14/18 [History Confirmed 03/14/18]


Tramadol HCl 50 mg PO BID 03/14/18 [History Confirmed 03/14/18]











- Review of Systems


Constitutional: No Symptoms Reported


Eyes: No Symptoms Reported


ENT: No Symptoms Reported


Respiratory: Wheezing


Cardiovascular: No Symptoms Reported.  denies: Edema


Gastrointestinal: No Symptoms Reported


Genitourinary: No Symptoms Reported


Musculoskeletal: Leg Pain, Foot Pain


Skin: Wound


Neurological: No Symptoms Reported





- Physical Exam


Vital Signs: 


 





Temperature                      97.4 F


Pulse Rate [Left Radial]         85


Pulse Rate                       73


Respiratory Rate                 20


Blood Pressure [Right Arm]       154/81


Blood Pressure [Left Arm]        143/78


Blood Pressure                   143/78


O2 Sat by Pulse Oximetry         99








Oriented: Normal


Eyes: Normal


Ear: Normal


Nose: Normal


Throat: Normal


Respiratory: LLL Exp. Wheeze


Cardiovascular: Normal


: Normal


Auscultation: Bowel Sounds: Normal


Palpation: Normal


Tenderness: Normal


Skin: Wound (BOTTOM OF 1ST, 2ND AND 3RD DIGITS TO BOTH FEET)


Musculoskeletal: Back:Lumbar


Psychiatric: Anxiety


Affect: Anxious


Speech Pattern: Clear, Appropriate





- Assessment/Plan


(1) Diabetic ulcer of toe associated with diabetes mellitus due to underlying 

condition


Status: Acute   


Plan: IV ATBX, WOUND CARE.  OBTAIN RAD REPORT FROM [facility] IN [place].  SURGICAL 

CONSULT.  PAIN CONTROL, BLOOD SUGAR CONTROL, ADMISSION LABS.  SURGICAL CONSULT 

  





(2) Diabetic ulcer of toes of both feet


Status: Acute   





(3) CAD (coronary artery disease)


Status: Chronic   





(4) Diabetes mellitus, type 2


Status: Chronic   





(5) Hypertension


Status: Chronic
No